# Patient Record
Sex: FEMALE | Race: ASIAN | NOT HISPANIC OR LATINO
[De-identification: names, ages, dates, MRNs, and addresses within clinical notes are randomized per-mention and may not be internally consistent; named-entity substitution may affect disease eponyms.]

---
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
## 2019-01-09 ENCOUNTER — OTHER (OUTPATIENT)
Age: 19
End: 2019-01-09

## 2019-02-25 ENCOUNTER — APPOINTMENT (OUTPATIENT)
Dept: NEUROLOGY | Facility: CLINIC | Age: 19
End: 2019-02-25
Payer: MEDICAID

## 2019-02-25 VITALS
BODY MASS INDEX: 27.6 KG/M2 | SYSTOLIC BLOOD PRESSURE: 116 MMHG | HEART RATE: 92 BPM | HEIGHT: 62 IN | DIASTOLIC BLOOD PRESSURE: 74 MMHG | WEIGHT: 150 LBS

## 2019-02-25 PROCEDURE — 99214 OFFICE O/P EST MOD 30 MIN: CPT

## 2019-02-25 NOTE — DISCUSSION/SUMMARY
[FreeTextEntry1] : Ms. Ramirez is an 17 year old woman with intellectual disability, attention issues, seizures, behavioral issues. \par \par 1. Seizures - She has been seizure free. She is off Depakote and will continue on Zonisamide 100 mg bid. She is not experiencing any side effects. \par Repeat EEGs will be done in the future.\par \par 2. ADHD - It is unclear when this diagnosis was made but her mother states that she has been taking Adderall for a long time with benefit. The  Registry shows that these prescriptions date back to at least December 2017.\par Will continue with Adderall 30 mg/day for now.\par \par 3. Behavioral problems - Behavior is overall improved. \par Continue Abilify 5 mg daily. \par Xanax 0.25 mg can be used sporadically for anxiety.\par She would benefit from outpatient psychology and psychiatry.\par Her mother has not had success finding a psychiatrist. I wrote down some names of psychiatrists from the [facility] website. She will try these to see if any have availability.\par \par Follow up in four months, sooner if needed.

## 2019-02-25 NOTE — PHYSICAL EXAM
[FreeTextEntry1] : Examination:\par Constitutional: normal, no apparent distress\par Eyes: normal conjunctiva b/l, no ptosis, visual fields full\par Respiratory: no respiratory distress, normal effort, normal auscultation\par Cardiovascular: normal rate, rhythm, no murmurs\par Neck: supple, no masses\par Vascular: carotids normal\par Skin: normal color, no rashes\par Psych: normal mood, affect\par \par Neurological:\par Language intact/no aphasia\par Cranial Nerves: II-XII intact, Pupils equally round and reactive to light, ocular muscles/movements intact, no ptosis, no facial weakness, tongue protrudes normally in the midline, \par Motor: normal tone, no pronator drift, full strength in all four extremities in the proximal and distal muscle groups\par Coordination: Fine motor movements intact, rapid alternating movements intact, finger to nose intact bilaterally\par Sensory: intact to light touch\par DTRs: symmetric, normal\par Gait: narrow based, steady\par .

## 2019-02-25 NOTE — HISTORY OF PRESENT ILLNESS
[FreeTextEntry1] : I last saw Gretchen Ramirez on 10/29/18.\par \par Her Abilify was increased to 5 mg after the last visit after her mother reported increased aggressive behavior at school. This increase was previously suggested by Dr. Lara, the consulting psychiatrist at her school.\par \par Her mother reports that for the last 2-3 months she was obsessed with one of her cinda. She was acting inappropriately. She is no longer in the same class with that para.Her teacher has made arrangements for her to see her once a week. \par She has been better over the last two weeks.\par \par Dr. Lara gave her some referrals for psychiatrists but none of them took Medicaid.\par \par Her mother says that she always has questions. Her head is full of questions. Her mother thinks that she would benefit from a psychiatrist. \par \par She has not had any activity suspicious for seizures other than occasionally staring.\par She is not waking up with a tongue bite or urinary incontinence. \par \par She can get agitated easily. \par Gretchen says that sometimes she has some difficulty concentrating but she feels like the medication helps.\par \par She is sleeping well.\par She has been gaining weight.\par Her mother says that she does not exercise other than occasional yoga.\par \par \par \par

## 2019-02-25 NOTE — DATA REVIEWED
[de-identified] : Routine EEG 7/18/18: normal  [de-identified] : CT brain 7/18/18: no intracranial hemorrhage or mass effect. \par

## 2019-03-26 NOTE — ED PROVIDER NOTE - NS_EDPROVIDERDISPOUSERTYPE_ED_A_ED
Patient should be seen in clinic for follow up first. If there are no available providers this week, I can see her next week Tuesday and double booked at 8 AM    Thanks    Juan Manuel Ruiz MD  [facility], [facility] - Gastroenterology  3/26/2019  5:51 PM Attending Attestation (For Attendings USE Only)...

## 2019-04-08 ENCOUNTER — RX RENEWAL (OUTPATIENT)
Age: 19
End: 2019-04-08

## 2019-04-10 ENCOUNTER — OTHER (OUTPATIENT)
Age: 19
End: 2019-04-10

## 2019-04-10 RX ORDER — DIAZEPAM 10 MG/2ML
10 GEL RECTAL
Qty: 1 | Refills: 0 | Status: ACTIVE | COMMUNITY
Start: 2019-04-10 | End: 1900-01-01

## 2019-06-24 ENCOUNTER — APPOINTMENT (OUTPATIENT)
Dept: NEUROLOGY | Facility: CLINIC | Age: 19
End: 2019-06-24
Payer: MEDICAID

## 2019-06-24 VITALS
BODY MASS INDEX: 27.6 KG/M2 | SYSTOLIC BLOOD PRESSURE: 110 MMHG | HEIGHT: 62 IN | WEIGHT: 150 LBS | DIASTOLIC BLOOD PRESSURE: 70 MMHG | HEART RATE: 90 BPM

## 2019-06-24 PROCEDURE — 99213 OFFICE O/P EST LOW 20 MIN: CPT

## 2019-06-24 RX ORDER — SELENIUM SULFIDE 25 MG/ML
2.5 LOTION TOPICAL
Qty: 120 | Refills: 0 | Status: ACTIVE | COMMUNITY
Start: 2019-03-12

## 2019-06-24 RX ORDER — HYDROXYZINE HYDROCHLORIDE 10 MG/1
10 TABLET ORAL
Qty: 30 | Refills: 0 | Status: ACTIVE | COMMUNITY
Start: 2019-03-12

## 2019-06-24 RX ORDER — CLINDAMYCIN PHOSPHATE 10 MG/ML
1 SOLUTION TOPICAL
Qty: 60 | Refills: 0 | Status: ACTIVE | COMMUNITY
Start: 2019-03-12

## 2019-06-24 RX ORDER — MUPIROCIN 20 MG/G
2 OINTMENT TOPICAL
Qty: 22 | Refills: 0 | Status: ACTIVE | COMMUNITY
Start: 2019-04-16

## 2019-06-24 RX ORDER — ONDANSETRON 4 MG/1
4 TABLET, ORALLY DISINTEGRATING ORAL
Qty: 10 | Refills: 0 | Status: ACTIVE | COMMUNITY
Start: 2019-04-09

## 2019-06-24 RX ORDER — DOXYCYCLINE HYCLATE 50 MG/1
50 CAPSULE ORAL
Qty: 30 | Refills: 0 | Status: ACTIVE | COMMUNITY
Start: 2019-01-09

## 2019-06-24 NOTE — HISTORY OF PRESENT ILLNESS
[FreeTextEntry1] : I last saw Ms. Ramirez on 2/25/19.\par She has two days left of school and then will go to summer school starting on July 13th.\par Recently she has been doing well. The para that she had problems with is now retired.\par Her teachers have been happy with her.\par Overall she is doing well at school.\par She is overly friendly outside of the house.\par She has been more cooperative.\par She has not been taking Adderall. She last filled it in April. Her mother does not think that she needs it. \par Her school has not mentioned that she needs it.\par She sleeps much better without the Adderall. \par Occasionally she takes melatonin to sleep.\par \par  She denies having any seizures since the last visit. There have been no episodes of waking up with tongue bites, urinary incontinence, or unexplained muscle soreness. There have been no staring spells, lapses in time, myoclonus or  olfactory hallucinations.\par \par She went on vacation with her sisters to [facility]. She did not have any problems. \par \par She is not experiencing any medication side effects.\par \par The only medications that she is taking regularly are Zonisamide, Abilify and levothyroxine.\par \par

## 2019-06-24 NOTE — DISCUSSION/SUMMARY
[FreeTextEntry1] : Ms. Ramirez is an 17 year old woman with intellectual disability, attention issues, seizures, behavioral issues. \par \par 1. Seizures - She has been seizure free. She is off Depakote and will continue on Zonisamide 100 mg bid. She is not experiencing any side effects. \par Repeat EEGs will be done in the future.\par \par 2. ADHD - It is unclear when this diagnosis was made but her mother states that she has been taking Adderall for a long time with benefit. The [facility] Registry shows that these prescriptions date back to at least December 2017.\par Her mother has held her Adderall recently and feels that she is functioning well without it and is sleeping much better.\par She has experienced weight gain which may be related to stopping Adderall.\par \par 3. Behavioral problems - Behavior is overall improved. \par Continue Abilify 5 mg daily. \par Xanax 0.25 mg can be used sporadically for anxiety.\par She would benefit from outpatient psychology and psychiatry.\par \par She still has not found a psychiatrist.\par I gave her a list of psychiatrists at the last visit but none were taking new patients. \par \par Follow up in 4-6 months, sooner if needed. \par

## 2019-06-24 NOTE — PHYSICAL EXAM
[FreeTextEntry1] : Examination:\par Constitutional: normal, no apparent distress\par Eyes: normal conjunctiva b/l, no ptosis, visual fields full\par Respiratory: no respiratory distress, normal effort, normal auscultation\par Cardiovascular: normal rate, rhythm, no murmurs\par Neck: supple, no masses\par Vascular: carotids normal\par Skin: normal color, no rashes\par Psych: normal mood, affect\par \par Neurological:\par Memory: Mild psychomotor slowing.\par Language intact/no aphasia\par Cranial Nerves: II-XII intact, Pupils equally round and reactive to light, ocular muscles/movements intact, no ptosis, no facial weakness, tongue protrudes normally in the midline, \par Motor: normal tone, no pronator drift, full strength in all four extremities in the proximal and distal muscle groups\par Coordination: Fine motor movements intact, rapid alternating movements intact, finger to nose intact bilaterally\par Sensory: intact to light touch\par DTRs: symmetric, normal\par Gait: narrow based, steady\par

## 2019-06-24 NOTE — DATA REVIEWED
[de-identified] : Routine EEG 7/18/18: normal  [de-identified] : CT brain 7/18/18: no intracranial hemorrhage or mass effect. \par

## 2019-09-17 ENCOUNTER — OTHER (OUTPATIENT)
Age: 19
End: 2019-09-17

## 2019-09-26 ENCOUNTER — OTHER (OUTPATIENT)
Age: 19
End: 2019-09-26

## 2019-10-01 ENCOUNTER — OTHER (OUTPATIENT)
Age: 19
End: 2019-10-01

## 2019-10-02 ENCOUNTER — APPOINTMENT (OUTPATIENT)
Dept: NEUROLOGY | Facility: CLINIC | Age: 19
End: 2019-10-02
Payer: MEDICAID

## 2019-10-02 VITALS
HEART RATE: 90 BPM | SYSTOLIC BLOOD PRESSURE: 103 MMHG | BODY MASS INDEX: 27.6 KG/M2 | WEIGHT: 150 LBS | HEIGHT: 62 IN | DIASTOLIC BLOOD PRESSURE: 70 MMHG

## 2019-10-02 PROCEDURE — 99213 OFFICE O/P EST LOW 20 MIN: CPT

## 2019-10-02 NOTE — PHYSICAL EXAM
[FreeTextEntry1] : Examination:\par Constitutional: normal, no apparent distress\par Eyes: normal conjunctiva b/l, no ptosis, visual fields full\par Respiratory: no respiratory distress, normal effort, normal auscultation\par Cardiovascular: normal rate, rhythm, no murmurs\par Neck: supple, no masses\par Vascular: carotids normal\par Skin: normal color, no rashes\par Psych: normal mood, affect\par \par Neurological:\par Memory: Mild psychomotor slowing.\par Language intact/no aphasia\par Cranial Nerves: II-XII intact, Pupils equally round and reactive to light, ocular muscles/movements intact, no ptosis, no facial weakness, tongue protrudes normally in the midline, \par Motor: normal tone, no pronator drift, full strength in all four extremities in the proximal and distal muscle groups\par Coordination: Fine motor movements intact, rapid alternating movements intact, finger to nose intact bilaterally\par Sensory: intact to light touch\par DTRs: symmetric, normal\par Gait: narrow based, steady\par \par

## 2019-10-02 NOTE — HISTORY OF PRESENT ILLNESS
[FreeTextEntry1] : I last saw Ms. Ramirez on 6/24/19.\par She is accompanied by her mother today.\par She has been having behavioral problems over the last 5 days.\par She had been doing well but has now been acting out.\par She has been hyper at home and gets agitated.\par Today at school she started cursing. She says that she got angry because she wanted something and would not wait for it. She was sent to "home base" a place to take a break and calm down when students misbehave. She told her para that she was going to push her and kick her.\par She says that she does not like her para and she does not like the school's rules.\par \par Her mother says that she notices a change in her voice. She describes it as "thick".\par She is sleeping at night.\par She is no longer taking Adderall. When she took Adderall she had difficulty sleeping.\par \par Her mother thinks that something inside is bothering her but she does not know what it is.\par \par She has not had any convulsions or waking up with tongue bites or urinary incontinence.\par Her mother thinks that she has had staring spells. She may respond aver 30 seconds.\par \par She reports that she does exercise. She occasionally does yoga.\par \par The other day she spent hours in the kitchen making cookies.\par \par \par \par \par \par

## 2019-10-02 NOTE — DISCUSSION/SUMMARY
[FreeTextEntry1] : Ms. Ramirez is an 18 year old woman with intellectual disability, attention issues, seizures, behavioral issues. \par \par 1. Seizures - She has been free of convulsions. She is off Depakote and will continue on Zonisamide 100 mg bid. She is not experiencing any side effects at this time.\par Her mother is concerned about partial seizures due to staring spells. \par Will repeat EEG. \par \par 2. ADHD - It is unclear when this diagnosis was made but her mother states that she has been taking Adderall for a long time with benefit. The  Registry shows that these prescriptions date back to at least December 2017.\par Would continue to hold Adderall for now.\par \par 3. Behavioral problems - Behavior had been improved but she has had a recent worsening at home and at school.\par \par We discussed the need for her to have consequences for her poor behavior.\par We discussed the need for adherence to schedules and rules.\par She enjoys shopping. I suggested that an allowance should be contingent on her following the rules. \par Abilify increased to 7.5 mg daily.\par Xanax 0.25 mg can be used sporadically for anxiety.\par She would benefit from outpatient psychology and psychiatry.\par Since her mother has not been able to find a psychiatrist, will see if Dr. Lara or another [facility] psychiatrist can help.\par \par \par Follow up in 4-6 months, sooner if needed. \par

## 2019-10-08 ENCOUNTER — APPOINTMENT (OUTPATIENT)
Dept: NEUROLOGY | Facility: CLINIC | Age: 19
End: 2019-10-08
Payer: MEDICAID

## 2019-10-09 ENCOUNTER — APPOINTMENT (OUTPATIENT)
Dept: NEUROLOGY | Facility: CLINIC | Age: 19
End: 2019-10-09
Payer: MEDICAID

## 2019-10-09 PROCEDURE — 95816 EEG AWAKE AND DROWSY: CPT

## 2019-12-26 NOTE — ED PROVIDER NOTE - CPE EDP ENMT NORM
SEVERITY:- ABNORMAL ECG -

SINUS TACHYCARDIA

RBBB AND LAFB

PROBABLE LVH WITH SECONDARY REPOL ABNRM

:

Confirmed by: Nuvia Abdul MD 26-Dec-2019 16:24:00 normal...

## 2020-02-28 ENCOUNTER — RX RENEWAL (OUTPATIENT)
Age: 20
End: 2020-02-28

## 2020-03-02 ENCOUNTER — RX RENEWAL (OUTPATIENT)
Age: 20
End: 2020-03-02

## 2020-05-20 ENCOUNTER — APPOINTMENT (OUTPATIENT)
Dept: NEUROLOGY | Facility: CLINIC | Age: 20
End: 2020-05-20
Payer: MEDICAID

## 2020-05-20 VITALS — HEIGHT: 62 IN | WEIGHT: 150 LBS | BODY MASS INDEX: 27.6 KG/M2

## 2020-05-20 PROCEDURE — 99213 OFFICE O/P EST LOW 20 MIN: CPT | Mod: 95

## 2020-05-20 NOTE — DISCUSSION/SUMMARY
[FreeTextEntry1] : Ms. Ramirez is an 19 year old woman with intellectual disability, attention issues, seizures, behavioral issues. \par \par 1. Seizures - She has been free of convulsions. She is off Depakote and will continue on Zonisamide 100 mg bid. She is not experiencing any side effects at this time.\par Will eventually need blood work.\par \par 2. ADHD - It is unclear when this diagnosis was made but she was previously taking Adderall.\par At this time she is doing well without Adderall. She is able to manage with her online classes.\par Will hold off on Adderall at this time. \par \par \par 3. Behavioral problems - These issues occur sporadically. Currently behavior is well controlled.\par Continue Abilify 7.5 mg/day\par Encourage online social interactions with her friends who she has been missing since the quarantine.\par \par Refills for Zonisamide and Abilify sent to pharmacy. \par \par f/u 3-4 months, sooner if needed.\par

## 2020-05-20 NOTE — PHYSICAL EXAM
[FreeTextEntry1] : Examination:\par Patient is well appearing\par Neurological Examination:\par Mental Status: Awake, alert. \par Language: No aphasia. Some slowness of speech\par Cranial Nerves:\par  EOMI, No facial weakness, tongue protrudes in the midline\par Motor Exam: No pronator drift. Barrel roll intact. Fine motor movements intact in hands\par Able to stand up from chair without difficulty\par Sensory: intact on self exam\par Reflexes: Unable to examine\par Cerebellar: Finger to nose intact bilaterally\par \par \par

## 2020-05-20 NOTE — HISTORY OF PRESENT ILLNESS
[Home] : at home, [unfilled] , at the time of the visit. [Medical Office: ([facility])___] : at the medical office located in  [Verbal consent obtained from patient] : the patient, [unfilled] [Mother] : mother [FreeTextEntry4] : Patient's mother [FreeTextEntry1] : \par This is a telehealth visit that was performed with the originating site at the patient’s home and the distant site of my office at [address] [place] [address].\par Two way audio and visual technology was used. \par Verbal consent to participate in the telephone/video visit was obtained in lieu of in-person signature due to the coronavirus emergency.\par This particular visit occurred during the 2020 COVID-19 emergency. I discussed with the patient the nature of our telehealth visits, that:\par -I would evaluate the patient and recommend diagnostics and treatments based on my assessment.\par -Our sessions are not being recorded.\par -The patient acknowledged the risk of unsecure transmission of his/her information.\par -Our team would provide follow up care in person if/when the patient needs it.\par \par Gretchen Ramirez was last seen in the office on 10/2/19.\par She is doing distance learning.\par She reports that she has been watching videos about how to make friends and have good behavior.\par She states that she misses her friends.\par \par Her mother says that everything is going well now.\par \par In February she had a difficult time. She was having problems with behavior at school. She was aggressive.  This improved after about two weeks. \par Her behavior is now improved. \par \par She has not had any seizures.\par There have not been any episodes suspicious for seizures.\par \par Her behavior has been good during the quarantine.\par \par She is not taking Adderall at this time. It had been keeping her up at night.

## 2020-05-20 NOTE — DATA REVIEWED
[de-identified] : Routine EEG 7/18/18: normal \par EEG 10/9/19: excess beta activity [de-identified] : CT brain 7/18/18: no intracranial hemorrhage or mass effect. \par

## 2020-06-23 ENCOUNTER — NON-APPOINTMENT (OUTPATIENT)
Age: 20
End: 2020-06-23

## 2020-06-23 ENCOUNTER — RX RENEWAL (OUTPATIENT)
Age: 20
End: 2020-06-23

## 2020-06-29 ENCOUNTER — APPOINTMENT (OUTPATIENT)
Dept: NEUROLOGY | Facility: CLINIC | Age: 20
End: 2020-06-29
Payer: MEDICAID

## 2020-06-29 PROCEDURE — 99213 OFFICE O/P EST LOW 20 MIN: CPT | Mod: 95

## 2020-06-29 NOTE — HISTORY OF PRESENT ILLNESS
[Home] : at home, [unfilled] , at the time of the visit. [Medical Office: ([facility])___] : at the medical office located in  [Verbal consent obtained from patient] : the patient, [unfilled] [FreeTextEntry1] : \par This is a telehealth visit that was performed with the originating site at the patient’s home and the distant site of my office at [address] [place] [address].\par Two way audio and visual technology was used. \par Verbal consent to participate in the telephone/video visit was obtained in lieu of in-person signature due to the coronavirus emergency.\par This particular visit occurred during the 2020 COVID-19 emergency. I discussed with the patient the nature of our telehealth visits, that:\par -I would evaluate the patient and recommend diagnostics and treatments based on my assessment.\par -Our sessions are not being recorded.\par -The patient acknowledged the risk of unsecure transmission of his/her information.\par -Our team would provide follow up care in person if/when the patient needs it.\par \par I last saw Ms. Ramirez on 5/20/20 via televisit.\par \par Her mother states that she was doing well during school.\par However, since distance learning ended there have been behavioral problems.\par She has been agitated and can get aggressive at times.  She is screaming a lot. \par Her mother thinks that this is a cyclical issue. Every three months she seems to have two months of aggressive behavior. During that time her family has difficulty calming her down. After about two weeks she seems to settle down.\par It does not occur during the week of her period.\par Her behavior was very bad last week but now she is better.\par She was not having any episodes suspicious for seizures.\par \par She is going to start online school again in July.\par \par She is not under the care of a gynecologist. \par She is not under the care of a psychiatrist.\par \par  [FreeTextEntry3] : Patient's mother - Mrs. Naranjo

## 2020-06-29 NOTE — DISCUSSION/SUMMARY
[FreeTextEntry1] : Ms. Ramierz is an 19 year old woman with intellectual disability, attention issues, seizures, behavioral issues. \par \par 1. Seizures - She has been free of convulsions. She is off Depakote and will continue on Zonisamide 100 mg bid. She is not experiencing any side effects at this time.\par Will eventually need blood work.\par \par 2. ADHD - It is unclear when this diagnosis was made but she was previously taking Adderall.\par At this time she is doing well without Adderall. She is able to manage with her online classes.\par Will hold off on Adderall at this time. \par \par 3. Behavioral problems - These issues occur periodically - about every three months, lasting for 1-2 weeks.\par Can continue Abilify 7.5 mg/day \par Will order Xanax 0.25 mg bid prn to be used only for significant agitation.\par If not effective, can try temporary increase in Abilify dosage during periods of increased agitation.\par Encouraged a more structured day with scheduled activities.\par \par \par f/u 3-4 months, sooner if needed.

## 2020-06-29 NOTE — PHYSICAL EXAM
[FreeTextEntry1] : Examination:\par Patient is well appearing\par Neurological Examination:\par Mental Status: Awake, alert. \par Language: No aphasia. Some slowness of speech\par Cranial Nerves:\par  EOMI, No facial weakness, tongue protrudes in the midline\par Motor Exam: No pronator drift. Barrel roll intact. Fine motor movements intact in hands\par Able to stand up from chair without difficulty\par Sensory: intact on self exam\par Reflexes: Unable to examine\par Cerebellar: Finger to nose intact bilaterally

## 2020-06-29 NOTE — DATA REVIEWED
[de-identified] : CT brain 7/18/18: no intracranial hemorrhage or mass effect. \par  [de-identified] : Routine EEG 7/18/18: normal \par EEG 10/9/19: excess beta activity

## 2020-09-21 ENCOUNTER — RX RENEWAL (OUTPATIENT)
Age: 20
End: 2020-09-21

## 2021-01-19 ENCOUNTER — RX RENEWAL (OUTPATIENT)
Age: 21
End: 2021-01-19

## 2021-02-16 ENCOUNTER — APPOINTMENT (OUTPATIENT)
Dept: NEUROLOGY | Facility: CLINIC | Age: 21
End: 2021-02-16
Payer: MEDICAID

## 2021-02-16 PROCEDURE — 99213 OFFICE O/P EST LOW 20 MIN: CPT | Mod: 95

## 2021-02-16 RX ORDER — DEXTROAMPHETAMINE SACCHARATE, AMPHETAMINE ASPARTATE, DEXTROAMPHETAMINE SULFATE AND AMPHETAMINE SULFATE 7.5; 7.5; 7.5; 7.5 MG/1; MG/1; MG/1; MG/1
30 TABLET ORAL
Qty: 30 | Refills: 0 | Status: DISCONTINUED | COMMUNITY
Start: 2018-07-27 | End: 2021-02-16

## 2021-02-16 RX ORDER — ARIPIPRAZOLE 5 MG/1
5 TABLET ORAL
Qty: 45 | Refills: 3 | Status: DISCONTINUED | COMMUNITY
Start: 2019-10-01 | End: 2021-02-16

## 2021-02-16 RX ORDER — DEXTROAMPHETAMINE SACCHARATE, AMPHETAMINE ASPARTATE, DEXTROAMPHETAMINE SULFATE AND AMPHETAMINE SULFATE 7.5; 7.5; 7.5; 7.5 MG/1; MG/1; MG/1; MG/1
30 TABLET ORAL
Qty: 30 | Refills: 0 | Status: DISCONTINUED | COMMUNITY
Start: 2018-03-12 | End: 2021-02-16

## 2021-02-16 RX ORDER — ARIPIPRAZOLE 5 MG/1
5 TABLET ORAL DAILY
Qty: 30 | Refills: 5 | Status: DISCONTINUED | COMMUNITY
Start: 2018-11-28 | End: 2021-02-16

## 2021-02-16 RX ORDER — ARIPIPRAZOLE 2 MG/1
2 TABLET ORAL DAILY
Qty: 30 | Refills: 5 | Status: DISCONTINUED | COMMUNITY
Start: 2018-07-11 | End: 2021-02-16

## 2021-02-16 RX ORDER — ZONISAMIDE 100 MG/1
100 CAPSULE ORAL TWICE DAILY
Qty: 60 | Refills: 5 | Status: DISCONTINUED | COMMUNITY
Start: 2018-07-27 | End: 2021-02-16

## 2021-02-16 RX ORDER — ARIPIPRAZOLE 2 MG/1
2 TABLET ORAL
Qty: 45 | Refills: 5 | Status: DISCONTINUED | COMMUNITY
Start: 2018-10-29 | End: 2021-02-16

## 2021-02-16 RX ORDER — CEPHALEXIN 500 MG/1
500 CAPSULE ORAL
Qty: 14 | Refills: 0 | Status: DISCONTINUED | COMMUNITY
Start: 2019-04-16 | End: 2021-02-16

## 2021-02-16 NOTE — DATA REVIEWED
[de-identified] : Routine EEG 7/18/18: normal \par EEG 10/9/19: excess beta activity [de-identified] : CT brain 7/18/18: no intracranial hemorrhage or mass effect. \par

## 2021-02-16 NOTE — HISTORY OF PRESENT ILLNESS
[Home] : at home, [unfilled] , at the time of the visit. [Medical Office: ([facility])___] : at the medical office located in  [Verbal consent obtained from patient] : the patient, [unfilled] [FreeTextEntry1] : I last saw Ms. Ramirez on 6/29/20.\par Due to connection issues with [employer], [employer] was used.\par \par Her school was closed early last week due to a case of covid.\par She has been on quarantine because of a positive case in her class.\par \par She denies having any seizures since the last visit. There have been no episodes of waking up with tongue bites, urinary incontinence, or unexplained muscle soreness. There have been no staring spells, lapses in time,= or myoclonus. She denies having olfactory hallucinations.\par \par She reports sleeping well.\par \par She says her mood is "okay" but reports that she felt sad when school closed early.\par Her mother says that overall her behavior is good, but her mother does notice that her mood is up and down. \par \par \par \par \par

## 2021-02-16 NOTE — DISCUSSION/SUMMARY
[FreeTextEntry1] : Ms. Ramirez is an 20 year old woman with intellectual disability, attention issues, seizures, behavioral issues. \par \par 1. Seizures - She has been free of convulsions. \par Continue zonisamide 100 mg BID.\par Check zonisamide level and basic blood work.\par \par 2. ADHD - Off medications at this time. \par \par 3. Behavioral problems - These issues occur periodically\par Can continue Abilify 7.5 mg/day \par Will order Xanax 0.25 mg bid prn to be used only for significant agitation.\par If not effective, can try temporary increase in Abilify dosage during periods of increased agitation.\par Work on yoga, relaxation exercises.\par \par \par \par

## 2021-05-07 NOTE — ED ADULT NURSE NOTE - NS ED NURSE LEVEL OF CONSCIOUSNESS SPEECH
DOP called in stating that her mom has been complaining that her bottom hurts. Daughter says she sits a lot and has not noticed anything but would like to know if they need a prescription to get a donut cushion or if they can just go get one? Or maybe some ointment to be proactive or should she just keep a watch on her?     Please Advise Speaking Coherently

## 2021-06-02 LAB
ALBUMIN SERPL ELPH-MCNC: 4.7 G/DL
ALP BLD-CCNC: 80 U/L
ALT SERPL-CCNC: 18 U/L
ANION GAP SERPL CALC-SCNC: 10 MMOL/L
AST SERPL-CCNC: 17 U/L
BASOPHILS # BLD AUTO: 0.05 K/UL
BASOPHILS NFR BLD AUTO: 0.7 %
BILIRUB SERPL-MCNC: 0.2 MG/DL
BUN SERPL-MCNC: 16 MG/DL
CALCIUM SERPL-MCNC: 9.5 MG/DL
CHLORIDE SERPL-SCNC: 109 MMOL/L
CO2 SERPL-SCNC: 22 MMOL/L
CREAT SERPL-MCNC: 0.6 MG/DL
EOSINOPHIL # BLD AUTO: 0.07 K/UL
EOSINOPHIL NFR BLD AUTO: 1 %
GLUCOSE SERPL-MCNC: 107 MG/DL
HCT VFR BLD CALC: 38.5 %
HGB BLD-MCNC: 12.7 G/DL
IMM GRANULOCYTES NFR BLD AUTO: 0.6 %
LYMPHOCYTES # BLD AUTO: 2.75 K/UL
LYMPHOCYTES NFR BLD AUTO: 40.4 %
MAN DIFF?: NORMAL
MCHC RBC-ENTMCNC: 28 PG
MCHC RBC-ENTMCNC: 33 GM/DL
MCV RBC AUTO: 84.8 FL
MONOCYTES # BLD AUTO: 0.5 K/UL
MONOCYTES NFR BLD AUTO: 7.4 %
NEUTROPHILS # BLD AUTO: 3.39 K/UL
NEUTROPHILS NFR BLD AUTO: 49.9 %
PLATELET # BLD AUTO: 256 K/UL
POTASSIUM SERPL-SCNC: 4.5 MMOL/L
PROT SERPL-MCNC: 7.8 G/DL
RBC # BLD: 4.54 M/UL
RBC # FLD: 13.4 %
SODIUM SERPL-SCNC: 141 MMOL/L
WBC # FLD AUTO: 6.8 K/UL

## 2021-06-03 LAB — ZONISAMIDE SERPL-MCNC: 15.5 UG/ML

## 2021-06-04 ENCOUNTER — NON-APPOINTMENT (OUTPATIENT)
Age: 21
End: 2021-06-04

## 2021-07-08 ENCOUNTER — RX RENEWAL (OUTPATIENT)
Age: 21
End: 2021-07-08

## 2021-07-28 ENCOUNTER — APPOINTMENT (OUTPATIENT)
Dept: NEUROLOGY | Facility: CLINIC | Age: 21
End: 2021-07-28
Payer: MEDICAID

## 2021-07-28 VITALS
BODY MASS INDEX: 29.08 KG/M2 | HEART RATE: 78 BPM | SYSTOLIC BLOOD PRESSURE: 105 MMHG | HEIGHT: 62 IN | DIASTOLIC BLOOD PRESSURE: 69 MMHG | TEMPERATURE: 97.2 F | WEIGHT: 158 LBS

## 2021-07-28 PROCEDURE — 99213 OFFICE O/P EST LOW 20 MIN: CPT

## 2021-07-28 RX ORDER — ALPRAZOLAM 0.25 MG/1
0.25 TABLET ORAL
Qty: 30 | Refills: 0 | Status: ACTIVE | COMMUNITY
Start: 2020-06-29 | End: 1900-01-01

## 2021-07-28 RX ORDER — ARIPIPRAZOLE 15 MG/1
15 TABLET ORAL
Qty: 45 | Refills: 1 | Status: ACTIVE | COMMUNITY
Start: 2019-10-01 | End: 1900-01-01

## 2021-07-28 NOTE — DATA REVIEWED
[de-identified] : Routine EEG 7/18/18: normal \par EEG 10/9/19: excess beta activity [de-identified] : CT brain 7/18/18: no intracranial hemorrhage or mass effect. \par

## 2021-07-28 NOTE — PHYSICAL EXAM
[FreeTextEntry1] : Examination:\par Constitutional: normal, no apparent distress\par Eyes: normal conjunctiva b/l, no ptosis, visual fields full\par Respiratory: no respiratory distress, normal effort, normal auscultation\par Cardiovascular: normal rate, rhythm, no murmurs\par Neck: supple, no masses\par Vascular: carotids normal\par Skin: normal color, no rashes\par Psych: changes topic frequently\par \par Neurological:\par Memory: normal memory, oriented to person, place, time\par Language intact/no aphasia\par Cranial Nerves: II-XII intact, Pupils equally round and reactive to light, ocular muscles/movements intact, no ptosis, no facial weakness, tongue protrudes normally in the midline, \par Motor: normal tone, no pronator drift, full strength in all four extremities in the proximal and distal muscle groups\par Coordination: Fine motor movements intact, rapid alternating movements intact, finger to nose intact bilaterally\par Sensory: intact to light touch\par DTRs: symmetric, normal\par Gait: narrow based, steady\par

## 2021-07-28 NOTE — HISTORY OF PRESENT ILLNESS
[FreeTextEntry1] : I last saw Ms. Ramirez on 2/16/21.\par She is here today with her father.\par \par She is finished with school and is waiting to start a job training program.\par She may be able to go back to school for another year.\par \par Her family reports behavioral problems at home.\par Amtul admits that she is "hurting" her mother because her parents will not get her a dog.\par \par For a period of time she was having difficulty sleeping.\par However, now she is sleeping better.\par \par She spends the day watching television and on her phone on [employer], etc.\par \par She has not had any seizures.\par \par Her father reports that she cannot focus.\par She changes her mind about what she wants to do.\par \par Her father reports that her behavioral problems occur cyclically. This happens every few months for about 6-8 weeks.\par

## 2021-07-28 NOTE — DISCUSSION/SUMMARY
[FreeTextEntry1] : Ms. Ramirez is an 20 year old woman with intellectual disability, attention issues, seizures, behavioral issues. \par \par 1. Seizures - She has been free of convulsions. \par Continue zonisamide 100 mg BID.\par Check zonisamide level and basic blood work.\par \par 2. ADHD - Off medications at this time. Consider restarting when she starts a vocational training program.\par \par 3. Behavioral problems - These issues occur periodically\par Can continue Abilify 7.5 mg/day \par Will order additional Abilify 5 mg which can be given as well for higher dose during these cycles of behavioral problems.\par Will order Xanax 0.25 mg bid prn to be used only for significant agitation.\par Discussed with patient that she needs to show her parents that she is responsible enough for things she wants such as driving lessons, a dog, etc.\par \par \par f/u 3-4 months, sooner if needed.

## 2021-09-09 ENCOUNTER — NON-APPOINTMENT (OUTPATIENT)
Age: 21
End: 2021-09-09

## 2021-09-30 ENCOUNTER — RX RENEWAL (OUTPATIENT)
Age: 21
End: 2021-09-30

## 2021-09-30 RX ORDER — ARIPIPRAZOLE 5 MG/1
5 TABLET ORAL
Qty: 30 | Refills: 0 | Status: ACTIVE | COMMUNITY
Start: 2021-07-28 | End: 1900-01-01

## 2021-11-28 ENCOUNTER — RX RENEWAL (OUTPATIENT)
Age: 21
End: 2021-11-28

## 2022-03-21 ENCOUNTER — APPOINTMENT (OUTPATIENT)
Dept: NEUROLOGY | Facility: CLINIC | Age: 22
End: 2022-03-21
Payer: MEDICAID

## 2022-03-21 DIAGNOSIS — R45.1 RESTLESSNESS AND AGITATION: ICD-10-CM

## 2022-03-21 PROCEDURE — 99212 OFFICE O/P EST SF 10 MIN: CPT | Mod: 95

## 2022-03-21 NOTE — DATA REVIEWED
[de-identified] : Routine EEG 7/18/18: normal \par EEG 10/9/19: excess beta activity [de-identified] : CT brain 7/18/18: no intracranial hemorrhage or mass effect. \par

## 2022-03-21 NOTE — PHYSICAL EXAM
[FreeTextEntry1] : Video connection lost.\par Exam limited.\par Patient is well appearing.\par No aphasia.\par \par

## 2022-03-21 NOTE — DISCUSSION/SUMMARY
[FreeTextEntry1] : Ms. Ramirez is an 21 year old woman with intellectual disability, attention issues, seizures, behavioral issues. \par \par 1. Seizures - She has been free of convulsions. \par Continue zonisamide 100 mg BID.\par Check zonisamide level and basic blood work.\par \par 2. Behavioral problems - These issues occur periodically\par Abilify increased by psychiatrist.\par Continue Abilify 5 mg in the Am and 15 mg at night. \par \par 3. ADHD - Off medications at this time. Consider restarting when she starts a vocational training program.\par \par Patient is asking about whether or not she can try to get a 's license.\par I informed her that she first needs to take a written examination that she will need to pass. It is unclear if she will be able to pass that examination.\par \par f/u 6 months, sooner if needed. \par

## 2022-03-21 NOTE — HISTORY OF PRESENT ILLNESS
[Home] : at home, [unfilled] , at the time of the visit. [Medical Office: ([facility])___] : at the medical office located in  [Verbal consent obtained from patient] : the patient, [unfilled] [FreeTextEntry1] : Last visit 7/28/21.\par D[employer]imity was used due to issues connecting with [employer].\par \par Her mother reports that she is doing well.\par She is now seeing a psychiatrist (mother does not recall the name).\par She is taking Abilify 15 mg at night and 5 mg in the morning.\par This dose change has helped.\par She is going to a dayhab program.\par \par She has not had any seizures.\par She continues to use Zonisamide. \par \par \par \par

## 2022-12-10 NOTE — ED PROVIDER NOTE - DIAGNOSIS COUNSELING, MDM
75 conducted a detailed discussion... I had a detailed discussion with the patient and mother/guardian & brother regarding the historical points, exam findings, and any diagnostic results supporting the discharge diagnosis.

## 2023-01-26 NOTE — ED PROVIDER NOTE - NS ED MD EM SELECTION
16585 Detailed Itraconazole Pregnancy And Lactation Text: This medication is Pregnancy Category C and it isn't know if it is safe during pregnancy. It is also excreted in breast milk.

## 2023-02-07 ENCOUNTER — APPOINTMENT (OUTPATIENT)
Dept: ORTHOPEDIC SURGERY | Facility: CLINIC | Age: 23
End: 2023-02-07
Payer: MEDICAID

## 2023-02-07 VITALS — HEIGHT: 63 IN | BODY MASS INDEX: 28.35 KG/M2 | WEIGHT: 160 LBS

## 2023-02-07 DIAGNOSIS — S93.491A SPRAIN OF OTHER LIGAMENT OF RIGHT ANKLE, INITIAL ENCOUNTER: ICD-10-CM

## 2023-02-07 PROCEDURE — 99203 OFFICE O/P NEW LOW 30 MIN: CPT

## 2023-02-07 PROCEDURE — 73610 X-RAY EXAM OF ANKLE: CPT | Mod: RT

## 2023-02-09 PROBLEM — S93.491A SPRAIN OF ANTERIOR TALOFIBULAR LIGAMENT OF RIGHT ANKLE, INITIAL ENCOUNTER: Status: ACTIVE | Noted: 2023-02-09

## 2023-02-09 NOTE — ADDENDUM
[FreeTextEntry1] : This note was written by Ivet Camara on 02/07/2023 acting solely as a scribe for Dr. Fabiano Olmedo.\par \par All medical record entries made by the Scribe were at my, Dr. Fabiano Olmedo, direction and personally dictated by me on 02/07/2023. I have personally reviewed the chart and agree that the record accurately reflects my personal performance of the history, physical exam, assessment and plan.

## 2023-02-09 NOTE — HISTORY OF PRESENT ILLNESS
[de-identified] : 22 year old female special needs presents with her mother for eval of right ankle pain x 1 month. She attends a day care program and fell down steps injuring her ankle. The pain is constant brought on with flexion of the foot and walking. Reports residual swelling. Denies prior ankle injury, numbness or tingling.  Pain is localized over the lateral ankle.\par \par The patient's past medical history, past surgical history, medications and allergies were reviewed by me today with the patient and documented accordingly. In addition, the patient's family and social history, which were noncontributory to this visit, were reviewed also.

## 2023-02-09 NOTE — DISCUSSION/SUMMARY
[de-identified] : 21 y/o female with right ankle sprain. \par \par The patient presents with a sub-acute inversion injury to the right ankle with an injury to the lateral ligamentous complex. I outlined a treatment protocol that will require a period of activity modification and relative rest. Further nonoperative modalities of treatment include relative rest from impact loading exercise, NSAID's/tylenol prn, cold compress for swelling control, compressive wraps/bracing, gradual return to weight bearing and load bearing exercise with or without the guidance of physical therapy for balance/proprioceptive/strength training.\par \par In addition, I discussed the spectrum of sprain injuries and short and long term outcomes. The majority of sprain respond well to nonoperative modalities of treatment, and the indication for surgical management is typically reserved for ankle joints that become chronically and grossly unstable.\par \par Recommendation: HEP given. Ice/Tylenol/NSAIDs p.r.n..Lace-up brace given. \par \par Follow up as needed.

## 2023-02-09 NOTE — PHYSICAL EXAM
[de-identified] : Oriented to time, place, person\par Mood: Normal\par Affect: Normal\par Appearance: Healthy, well appearing, no acute distress.\par Gait: Normal\par Assistive Devices: None\par \par Right Ankle exam:\par \par Skin: Clean, dry, intact\par Inspection: No swelling, no effusion, cavus foot deformity. \par Pulses: 2+ DP/PT pulses \par ROM: 20 degrees of dorsiflexion, 35 degrees of plantarflexion, normal subtalar motion.\par Tenderness: Minimal tenderness over the lateral malleolus, + CFL +ATFL no PTFL pain. No medial malleolus pain, no deltoid ligament pain. No proximal fibular pain. No heel pain.  Mild peroneal pain\par Stability: Negative anterior drawer, negative posterior drawer.\par Strength: 5/5 TA/GS/EHL 4/5 inversion/eversion\par Neuro: In tact to light touch throughout\par Additional tests: Negative syndesmosis squeeze test.  [de-identified] : The following radiographs were ordered and read by me during this patients visit. I reviewed each radiograph in detail with the patient and discussed the findings as highlighted below. \par \par 3 views of the right ankle were obtained today, 02/07/2023, that show no acute fracture or dislocation. There is no significant degenerative change seen. There is no gross malalignment. Ankle mortise is intact. No significant other obvious acute osseous abnormality, otherwise unremarkable.

## 2023-02-11 ENCOUNTER — RX RENEWAL (OUTPATIENT)
Age: 23
End: 2023-02-11

## 2023-03-15 NOTE — ED PROVIDER NOTE - NEUROLOGICAL, MLM
Quality 226: Preventive Care And Screening: Tobacco Use: Screening And Cessation Intervention: Patient screened for tobacco use and is an ex/non-smoker Quality 111:Pneumonia Vaccination Status For Older Adults: Pneumococcal vaccine (PPSV23) administered on or after patient’s 60th birthday and before the end of the measurement period Detail Level: Detailed Alert and oriented, no focal deficits, no motor or sensory deficits. sensation intact, 5/5 strength, CN II-XII intact, no dysmetria, normal gait

## 2023-03-23 ENCOUNTER — APPOINTMENT (OUTPATIENT)
Dept: NEUROLOGY | Facility: CLINIC | Age: 23
End: 2023-03-23
Payer: MEDICAID

## 2023-04-17 ENCOUNTER — RX RENEWAL (OUTPATIENT)
Age: 23
End: 2023-04-17

## 2023-05-15 ENCOUNTER — RX RENEWAL (OUTPATIENT)
Age: 23
End: 2023-05-15

## 2023-06-15 ENCOUNTER — RX RENEWAL (OUTPATIENT)
Age: 23
End: 2023-06-15

## 2023-09-12 NOTE — ED PROVIDER NOTE - CROS ED GI ALL NEG
[id number]   Orientations: 4  Vitals/Pain: VSS on RA - 1L NC at night   Tele: SR/SB with BBB and 1st degree AV block  Lines/Drains: PIV removed at discharge  Skin/Wounds: abrasion/scabbing to R forearm and R knee  Dressing changed at discharge  GI/: WDL  Ambulation/Assist: SBA    Patient discharged to home with spouse and daughter - verbalized understanding of all discharge instructions and sent home with belongings/medications. Home oxygen for night time will be delivered to patients home - patient verbalized they are ok to pay out of pocket for the oxygen as they did not want to stay for an overnight oximetry test.     - - -

## 2023-11-30 ENCOUNTER — APPOINTMENT (OUTPATIENT)
Dept: NEUROLOGY | Facility: CLINIC | Age: 23
End: 2023-11-30
Payer: MEDICAID

## 2023-11-30 VITALS
WEIGHT: 165 LBS | HEIGHT: 63 IN | TEMPERATURE: 98.2 F | HEART RATE: 90 BPM | DIASTOLIC BLOOD PRESSURE: 72 MMHG | SYSTOLIC BLOOD PRESSURE: 110 MMHG | BODY MASS INDEX: 29.23 KG/M2

## 2023-11-30 PROCEDURE — 99213 OFFICE O/P EST LOW 20 MIN: CPT

## 2024-05-20 ENCOUNTER — RX RENEWAL (OUTPATIENT)
Age: 24
End: 2024-05-20

## 2024-05-20 RX ORDER — ZONISAMIDE 100 MG/1
100 CAPSULE ORAL
Qty: 180 | Refills: 0 | Status: ACTIVE | COMMUNITY
Start: 2018-07-11 | End: 1900-01-01

## 2024-05-23 ENCOUNTER — NON-APPOINTMENT (OUTPATIENT)
Age: 24
End: 2024-05-23

## 2024-05-24 ENCOUNTER — APPOINTMENT (OUTPATIENT)
Dept: NEUROLOGY | Facility: CLINIC | Age: 24
End: 2024-05-24
Payer: MEDICAID

## 2024-05-24 VITALS
DIASTOLIC BLOOD PRESSURE: 70 MMHG | TEMPERATURE: 98.2 F | HEART RATE: 81 BPM | BODY MASS INDEX: 29.23 KG/M2 | SYSTOLIC BLOOD PRESSURE: 105 MMHG | WEIGHT: 165 LBS | HEIGHT: 63 IN

## 2024-05-24 DIAGNOSIS — G40.909 EPILEPSY, UNSPECIFIED, NOT INTRACTABLE, W/OUT STATUS EPILEPTICUS: ICD-10-CM

## 2024-05-24 DIAGNOSIS — R62.0 DELAYED MILESTONE IN CHILDHOOD: ICD-10-CM

## 2024-05-24 DIAGNOSIS — F90.0 ATTENTION-DEFICIT HYPERACTIVITY DISORDER, PREDOMINANTLY INATTENTIVE TYPE: ICD-10-CM

## 2024-05-24 DIAGNOSIS — G47.00 INSOMNIA, UNSPECIFIED: ICD-10-CM

## 2024-05-24 PROCEDURE — G2211 COMPLEX E/M VISIT ADD ON: CPT | Mod: NC

## 2024-05-24 PROCEDURE — 99214 OFFICE O/P EST MOD 30 MIN: CPT

## 2024-05-24 NOTE — PHYSICAL EXAM
[General Appearance - Alert] : alert [General Appearance - In No Acute Distress] : in no acute distress [Oriented To Time, Place, And Person] : oriented to person, place, and time [Impaired Insight] : insight and judgment were intact [Affect] : the affect was normal [Person] : oriented to person [Place] : oriented to place [Time] : oriented to time [Sensation Tactile Decrease] : light touch was intact [Abnormal Walk] : normal gait [Sclera] : the sclera and conjunctiva were normal [PERRL With Normal Accommodation] : pupils were equal in size, round, reactive to light, with normal accommodation [Outer Ear] : the ears and nose were normal in appearance [Neck Appearance] : the appearance of the neck was normal [] : no respiratory distress

## 2024-05-24 NOTE — ASSESSMENT
[FreeTextEntry1] : Ms. Ramirez is an 22 year old woman with intellectual disability, attention issues, seizures, behavioral issues, here for follow-up for seizure control. Sleeping well at this time, no issues with insomnia recently.   Seizures -She has been free of convulsions. -Continue zonisamide 100 mg BID. -She should have basic blood tests when she next sees her PCP - Patient has been seizure free for over a year, and therefore from a neurological standpoint ok to drive, but would highly recommend consultation with Psychiatrist and DMV before pursuing this given behavioral issues and impulsivity, as those would likely impair her driving ability.   Behavioral problems -Continue Abilify 10 mg in the AM and 20 mg in the PM, and risperidone as needed.  -Continue follow up with Dr. Menchaca, psychiatrist.  Follow-up in 6 months or sooner if needed.

## 2024-05-24 NOTE — HISTORY OF PRESENT ILLNESS
[FreeTextEntry1] : 5/24/24: Since last visit, patient denies any seizure activity. Patient reports about a week ago she had a virus. with diarrhea and vomiting but resolved about 4 days later. Still on Zonisamide 100mg BID for seizures, and still taking Abilify at 10mg in the AM, 20mg at PM. Still in day program, and now taking [employer] classes. Risperidone is just using as needed, often just once a week about. Curious about driving lessons.     11/30/23: with Dr Chery She is here today with her mother. She goes to a day program from 8:30-2:30 each day. She has not had any recent seizures. She is following with a psychiatrist, Dr. Henao. He increased her Abilify. She is taking 10 mg in the AM and 20 mg at night.  Risperidone was added on an as needed basis. This has helped but there are times when her behavior is poor and she does not sleep.

## 2024-11-05 ENCOUNTER — NON-APPOINTMENT (OUTPATIENT)
Age: 24
End: 2024-11-05

## 2024-11-05 ENCOUNTER — APPOINTMENT (OUTPATIENT)
Dept: NEUROLOGY | Facility: CLINIC | Age: 24
End: 2024-11-05
Payer: MEDICAID

## 2024-11-05 VITALS
HEIGHT: 63 IN | TEMPERATURE: 98.2 F | DIASTOLIC BLOOD PRESSURE: 70 MMHG | SYSTOLIC BLOOD PRESSURE: 105 MMHG | BODY MASS INDEX: 29.23 KG/M2 | HEART RATE: 82 BPM | WEIGHT: 165 LBS

## 2024-11-05 DIAGNOSIS — G40.909 EPILEPSY, UNSPECIFIED, NOT INTRACTABLE, W/OUT STATUS EPILEPTICUS: ICD-10-CM

## 2024-11-05 PROCEDURE — 99213 OFFICE O/P EST LOW 20 MIN: CPT

## 2024-11-05 PROCEDURE — G2211 COMPLEX E/M VISIT ADD ON: CPT | Mod: NC

## 2024-11-06 ENCOUNTER — APPOINTMENT (OUTPATIENT)
Dept: NEUROLOGY | Facility: CLINIC | Age: 24
End: 2024-11-06
Payer: MEDICAID

## 2024-11-06 PROCEDURE — 95816 EEG AWAKE AND DROWSY: CPT

## 2024-11-07 ENCOUNTER — NON-APPOINTMENT (OUTPATIENT)
Age: 24
End: 2024-11-07

## 2025-01-29 NOTE — ED ADULT NURSE NOTE - DRUG PRE-SCREENING (DAST -1)
Continued Stay SW/CM Assessment/Plan of Care Note       Active Substitute Decision Maker (SDM)       SHOBHA SAUCEDO Health Care Agent 1 - Spouse   Primary Phone: [phone] (Mobile)  Home Phone: [phone]                     Progress note:  SW called and left VM update for APOA Shobha requesting a call back to discuss discharge recommendations.     SW following    See SW/CM flowsheets for other objective data.    Disposition Recommendations:  Preliminary discharge destination:    SW/CM recommendation for discharge: Home, SNF, 24 Hour assist, Assisted living, [facility]    Destination Pharmacy:        Discharge Plan/Needs:     Continued Care and Services - Admitted Since 1/27/2025    No active coordination exists for this encounter.           Devices/ Equipment that need to be arranged for discharge:     Accepted   Pending insurance authorization   Others:    Anticipated date of DME availability:     Prior To Hospitalization:    Living Situation:   and residing at House    .  Support Systems: Spouse   Home Devices/Equipment: Mobility assist device            Mobility Assist Devices: Wheelchair   Type of Service Prior to Hospitalization: DME, Home care               Patient/Family discharge goal (s):  Home     Resources provided:           Prior Function                Current Function  Last Filed Values         Value Time User    Current Function  slightly below baseline level of function 1/28/2025 12:55 PM Cherise Bey, PT    Therapy Impairments  activity tolerance; balance; strength; safety awareness 1/28/2025 12:55 PM Cherise Bey, PT    ADLs Requiring Support  bed mobility; transfers; ambulation; stairs 1/28/2025 12:55 PM Cherise Bey, PT            Therapy Recommendations for Discharge:   PT:      Last Filed Values         Value Time User    PT Discharge Needs  therapy 5 or more times per week 1/28/2025 12:55 PM Cherise Bey, PT          OT:       Last Filed Values         Value Time User     OT Discharge Needs  therapy 5 or more times per week 1/28/2025 12:10 PM Juliet Hatfield, OT          SLP:    Last Filed Values       None            Mobility Equipment Recommended for Discharge: has power wc, 2ww      Barriers to Discharge  Identified Barriers to Discharge/Transition Planning:                     Statement Selected

## 2025-02-03 ENCOUNTER — RX RENEWAL (OUTPATIENT)
Age: 25
End: 2025-02-03

## 2025-02-26 NOTE — ED ADULT NURSE NOTE - DISCHARGE DATE/TIME
Orders Placed This Encounter   Procedures    Wound cleansing and dressings     Left knee wound healed today!    Apply moisturizer daily     Continue to wear compression stockings to bilateral lower extremity  Spandagrip E placed today. Ensure it measures from base of toes to base of knee.        Follow up with wound center as needed     Standing Status:   Future     Expiration Date:   3/5/2025      
26-Jan-2018 16:36

## 2025-04-25 ENCOUNTER — APPOINTMENT (OUTPATIENT)
Dept: NEUROLOGY | Facility: CLINIC | Age: 25
End: 2025-04-25
Payer: MEDICAID

## 2025-04-25 VITALS
SYSTOLIC BLOOD PRESSURE: 133 MMHG | HEART RATE: 89 BPM | DIASTOLIC BLOOD PRESSURE: 74 MMHG | WEIGHT: 168 LBS | BODY MASS INDEX: 29.77 KG/M2 | HEIGHT: 63 IN

## 2025-04-25 DIAGNOSIS — G40.909 EPILEPSY, UNSPECIFIED, NOT INTRACTABLE, W/OUT STATUS EPILEPTICUS: ICD-10-CM

## 2025-04-25 PROCEDURE — G2211 COMPLEX E/M VISIT ADD ON: CPT | Mod: NC

## 2025-04-25 PROCEDURE — 99214 OFFICE O/P EST MOD 30 MIN: CPT

## 2025-04-25 RX ORDER — CHROMIUM 200 MCG
TABLET ORAL
Refills: 0 | Status: ACTIVE | COMMUNITY

## 2025-04-25 RX ORDER — ARIPIPRAZOLE 10 MG/1
10 TABLET ORAL
Refills: 0 | Status: ACTIVE | COMMUNITY

## 2025-05-02 ENCOUNTER — APPOINTMENT (OUTPATIENT)
Dept: NEUROLOGY | Facility: CLINIC | Age: 25
End: 2025-05-02
Payer: MEDICAID

## 2025-05-02 PROCEDURE — 95816 EEG AWAKE AND DROWSY: CPT

## 2025-05-05 ENCOUNTER — APPOINTMENT (OUTPATIENT)
Dept: NEUROLOGY | Facility: CLINIC | Age: 25
End: 2025-05-05
Payer: MEDICAID

## 2025-05-05 PROCEDURE — 95708 EEG WO VID EA 12-26HR UNMNTR: CPT

## 2025-05-05 PROCEDURE — 95700 EEG CONT REC W/VID EEG TECH: CPT

## 2025-05-05 PROCEDURE — 95719 EEG PHYS/QHP EA INCR W/O VID: CPT
